# Patient Record
Sex: FEMALE | Race: BLACK OR AFRICAN AMERICAN | NOT HISPANIC OR LATINO | Employment: FULL TIME | URBAN - METROPOLITAN AREA
[De-identification: names, ages, dates, MRNs, and addresses within clinical notes are randomized per-mention and may not be internally consistent; named-entity substitution may affect disease eponyms.]

---

## 2021-11-23 ENCOUNTER — HOSPITAL ENCOUNTER (EMERGENCY)
Facility: CLINIC | Age: 46
Discharge: HOME | End: 2021-11-23
Attending: EMERGENCY MEDICINE | Admitting: EMERGENCY MEDICINE
Payer: OTHER GOVERNMENT

## 2021-11-23 ENCOUNTER — APPOINTMENT (OUTPATIENT)
Dept: GENERAL RADIOLOGY | Facility: CLINIC | Age: 46
End: 2021-11-23
Attending: EMERGENCY MEDICINE
Payer: OTHER GOVERNMENT

## 2021-11-23 VITALS
HEART RATE: 72 BPM | OXYGEN SATURATION: 100 % | WEIGHT: 250 LBS | SYSTOLIC BLOOD PRESSURE: 145 MMHG | RESPIRATION RATE: 18 BRPM | BODY MASS INDEX: 40.18 KG/M2 | DIASTOLIC BLOOD PRESSURE: 98 MMHG | HEIGHT: 66 IN | TEMPERATURE: 98.6 F

## 2021-11-23 DIAGNOSIS — I10 ESSENTIAL HYPERTENSION: ICD-10-CM

## 2021-11-23 DIAGNOSIS — R50.9 FEVER AND CHILLS: ICD-10-CM

## 2021-11-23 LAB
ALBUMIN SERPL-MCNC: 3.1 G/DL (ref 3.4–5)
ALBUMIN UR-MCNC: 50 MG/DL
ALP SERPL-CCNC: 56 U/L (ref 40–150)
ALT SERPL W P-5'-P-CCNC: 24 U/L (ref 0–50)
ANION GAP SERPL CALCULATED.3IONS-SCNC: 3 MMOL/L (ref 3–14)
APPEARANCE UR: CLEAR
AST SERPL W P-5'-P-CCNC: 19 U/L (ref 0–45)
BASOPHILS # BLD AUTO: 0 10E3/UL (ref 0–0.2)
BASOPHILS NFR BLD AUTO: 0 %
BILIRUB SERPL-MCNC: 0.2 MG/DL (ref 0.2–1.3)
BILIRUB UR QL STRIP: NEGATIVE
BUN SERPL-MCNC: 12 MG/DL (ref 7–30)
CALCIUM SERPL-MCNC: 8.4 MG/DL (ref 8.5–10.1)
CHLORIDE BLD-SCNC: 113 MMOL/L (ref 94–109)
CO2 SERPL-SCNC: 23 MMOL/L (ref 20–32)
COLOR UR AUTO: ABNORMAL
CREAT SERPL-MCNC: 1.08 MG/DL (ref 0.52–1.04)
EOSINOPHIL # BLD AUTO: 0.3 10E3/UL (ref 0–0.7)
EOSINOPHIL NFR BLD AUTO: 4 %
ERYTHROCYTE [DISTWIDTH] IN BLOOD BY AUTOMATED COUNT: 14.6 % (ref 10–15)
FLUAV RNA SPEC QL NAA+PROBE: NEGATIVE
FLUBV RNA RESP QL NAA+PROBE: NEGATIVE
GFR SERPL CREATININE-BSD FRML MDRD: 62 ML/MIN/1.73M2
GLUCOSE BLD-MCNC: 118 MG/DL (ref 70–99)
GLUCOSE UR STRIP-MCNC: NEGATIVE MG/DL
HCT VFR BLD AUTO: 39 % (ref 35–47)
HGB BLD-MCNC: 12.6 G/DL (ref 11.7–15.7)
HGB UR QL STRIP: ABNORMAL
IMM GRANULOCYTES # BLD: 0 10E3/UL
IMM GRANULOCYTES NFR BLD: 0 %
KETONES UR STRIP-MCNC: NEGATIVE MG/DL
LEUKOCYTE ESTERASE UR QL STRIP: ABNORMAL
LYMPHOCYTES # BLD AUTO: 1.9 10E3/UL (ref 0.8–5.3)
LYMPHOCYTES NFR BLD AUTO: 26 %
MCH RBC QN AUTO: 26.5 PG (ref 26.5–33)
MCHC RBC AUTO-ENTMCNC: 32.3 G/DL (ref 31.5–36.5)
MCV RBC AUTO: 82 FL (ref 78–100)
MONOCYTES # BLD AUTO: 0.4 10E3/UL (ref 0–1.3)
MONOCYTES NFR BLD AUTO: 6 %
MUCOUS THREADS #/AREA URNS LPF: PRESENT /LPF
NEUTROPHILS # BLD AUTO: 4.6 10E3/UL (ref 1.6–8.3)
NEUTROPHILS NFR BLD AUTO: 64 %
NITRATE UR QL: NEGATIVE
NRBC # BLD AUTO: 0 10E3/UL
NRBC BLD AUTO-RTO: 0 /100
PH UR STRIP: 5.5 [PH] (ref 5–7)
PLATELET # BLD AUTO: 302 10E3/UL (ref 150–450)
POTASSIUM BLD-SCNC: 4.3 MMOL/L (ref 3.4–5.3)
PROT SERPL-MCNC: 7.1 G/DL (ref 6.8–8.8)
RBC # BLD AUTO: 4.75 10E6/UL (ref 3.8–5.2)
RBC URINE: 1 /HPF
SARS-COV-2 RNA RESP QL NAA+PROBE: NEGATIVE
SODIUM SERPL-SCNC: 139 MMOL/L (ref 133–144)
SP GR UR STRIP: 1.02 (ref 1–1.03)
SQUAMOUS EPITHELIAL: 6 /HPF
UROBILINOGEN UR STRIP-MCNC: NORMAL MG/DL
WBC # BLD AUTO: 7.2 10E3/UL (ref 4–11)
WBC URINE: 6 /HPF

## 2021-11-23 PROCEDURE — 80053 COMPREHEN METABOLIC PANEL: CPT | Performed by: EMERGENCY MEDICINE

## 2021-11-23 PROCEDURE — 87086 URINE CULTURE/COLONY COUNT: CPT | Performed by: EMERGENCY MEDICINE

## 2021-11-23 PROCEDURE — 85004 AUTOMATED DIFF WBC COUNT: CPT | Performed by: EMERGENCY MEDICINE

## 2021-11-23 PROCEDURE — 81001 URINALYSIS AUTO W/SCOPE: CPT | Performed by: EMERGENCY MEDICINE

## 2021-11-23 PROCEDURE — C9803 HOPD COVID-19 SPEC COLLECT: HCPCS

## 2021-11-23 PROCEDURE — 36415 COLL VENOUS BLD VENIPUNCTURE: CPT | Performed by: EMERGENCY MEDICINE

## 2021-11-23 PROCEDURE — 99284 EMERGENCY DEPT VISIT MOD MDM: CPT | Mod: 25

## 2021-11-23 PROCEDURE — 87636 SARSCOV2 & INF A&B AMP PRB: CPT | Performed by: EMERGENCY MEDICINE

## 2021-11-23 PROCEDURE — 71046 X-RAY EXAM CHEST 2 VIEWS: CPT

## 2021-11-23 RX ORDER — HYDROCHLOROTHIAZIDE 25 MG/1
25 TABLET ORAL DAILY
Qty: 30 TABLET | Refills: 0 | Status: SHIPPED | OUTPATIENT
Start: 2021-11-23

## 2021-11-23 ASSESSMENT — ENCOUNTER SYMPTOMS
COUGH: 0
BACK PAIN: 1
VOMITING: 0
MYALGIAS: 1
FEVER: 1
DYSURIA: 0
ABDOMINAL PAIN: 0
NAUSEA: 0
SORE THROAT: 0
FREQUENCY: 1

## 2021-11-23 ASSESSMENT — MIFFLIN-ST. JEOR: SCORE: 1795.74

## 2021-11-23 NOTE — ED PROVIDER NOTES
"  History   Chief Complaint:  Fever    The history is provided by the patient.      Cristy Sandoval is a 46 year old female who presents to the ER with a fever. Her fever started this morning with a measured temp of 102. She reports associated myalgias and right-sided low back pain. She is using the bathroom frequently and denies pain when urinating. She denies any nausea, vomiting or abdominal pain. No cough or sore throat. She states not being in contact with anyone sick but works at the [employer] as a . No history of abdominal surgery other than a tubal ligation. She smokes tobacco and denies drinking alcohol.     Review of Systems   Constitutional: Positive for fever.   HENT: Negative for sore throat.    Respiratory: Negative for cough.    Gastrointestinal: Negative for abdominal pain, nausea and vomiting.   Genitourinary: Positive for frequency. Negative for dysuria.   Musculoskeletal: Positive for back pain and myalgias.   All other systems reviewed and are negative.        Allergies:  No Known Allergies    Medications:  The patient is currently on no regular medications.    Past Medical History:     Hypertension    Past Surgical History:  Tubal ligation     Social History:  Presents unaccompanied  Works at the [employer]  Endorses tobacco use  Denies alcohol use    Physical Exam     Patient Vitals for the past 24 hrs:   BP Temp Temp src Pulse Resp SpO2 Height Weight   11/23/21 1140 (!) 145/98 -- -- 72 -- 100 % -- --   11/23/21 1020 (!) 171/126 -- -- 95 -- 100 % -- --   11/23/21 0933 (!) 180/103 98.6  F (37  C) Temporal 91 18 99 % 1.676 m (5' 6\") 113.4 kg (250 lb)       Physical Exam  General: Patient is alert and normal appearing.  HEENT: Head atraumatic    Eyes: pupils equal and reactive. Conjunctiva clear   Nares: patent   Oropharynx: no lesions, uvula midline, no palatal draping, normal voice, no trismus  Neck: Supple without lymphadenopathy, no meningismus  Chest: Heart regular rate and rhythm. "   Lungs: Equal clear to auscultation with no wheeze or rales  Abdomen: Soft, non tender, nondistended, normal bowel sounds  Back: No costovertebral angle tenderness, no midline C, T or L spine tenderness  Neuro: Grossly nonfocal, normal speech, strength equal bilaterally, CN 2-12 intact  Extremities: No deformities, equal radial and DP pulses. No clubbing, cyanosis.  No edema  Skin: Warm and dry with no rash.       Emergency Department Course     Imaging:  XR Chest 2 Views   Final Result   IMPRESSION: PA and lateral views of the chest were obtained.   Cardiomediastinal silhouette is within normal limits. No suspicious   focal pulmonary opacities. No significant pleural effusion or   pneumothorax.      ALEX HOWARD MD            SYSTEM ID:  [id number]        Report per radiology    Laboratory:  Labs Ordered and Resulted from Time of ED Arrival to Time of ED Departure   COMPREHENSIVE METABOLIC PANEL - Abnormal       Result Value    Sodium 139      Potassium 4.3      Chloride 113 (*)     Carbon Dioxide (CO2) 23      Anion Gap 3      Urea Nitrogen 12      Creatinine 1.08 (*)     Calcium 8.4 (*)     Glucose 118 (*)     Alkaline Phosphatase 56      AST 19      ALT 24      Protein Total 7.1      Albumin 3.1 (*)     Bilirubin Total 0.2      GFR Estimate 62     ROUTINE UA WITH MICROSCOPIC REFLEX TO CULTURE - Abnormal    Color Urine Light Yellow      Appearance Urine Clear      Glucose Urine Negative      Bilirubin Urine Negative      Ketones Urine Negative      Specific Gravity Urine 1.017      Blood Urine Small (*)     pH Urine 5.5      Protein Albumin Urine 50  (*)     Urobilinogen Urine Normal      Nitrite Urine Negative      Leukocyte Esterase Urine Small (*)     Mucus Urine Present (*)     RBC Urine 1      WBC Urine 6 (*)     Squamous Epithelials Urine 6 (*)    INFLUENZA A/B & SARS-COV2 PCR MULTIPLEX - Normal    Influenza A PCR Negative      Influenza B PCR Negative      SARS CoV2 PCR Negative     CBC WITH PLATELETS  AND DIFFERENTIAL    WBC Count 7.2      RBC Count 4.75      Hemoglobin 12.6      Hematocrit 39.0      MCV 82      MCH 26.5      MCHC 32.3      RDW 14.6      Platelet Count 302      % Neutrophils 64      % Lymphocytes 26      % Monocytes 6      % Eosinophils 4      % Basophils 0      % Immature Granulocytes 0      NRBCs per 100 WBC 0      Absolute Neutrophils 4.6      Absolute Lymphocytes 1.9      Absolute Monocytes 0.4      Absolute Eosinophils 0.3      Absolute Basophils 0.0      Absolute Immature Granulocytes 0.0      Absolute NRBCs 0.0     URINE CULTURE        Emergency Department Course:  Reviewed:  I reviewed nursing notes, vitals, past medical history, Care Everywhere    Assessments:  1017 I obtained history and examined the patient as noted above.   1123 Patient rechecked and updated.  She was notified of her high blood pressure while incarcerated but has not been started on medications for this and does not have a primary care provider.     Disposition:  The patient was discharged to home.     Impression & Plan     Medical Decision Making:  Cristy Sandoval is a 46 year old female who presents for evaluation of fever, myalgia, right flank pain that began this morning. There is no signs at this point of serious bacterial infection such as OM, RPA, epiglottitis, PTA, strep pharyngitis, pneumonia, sinusitis, meningitis, bacteremia, serious bacterial infection.  Chest x-ray is negative for infiltrate.  Urinalysis is negative for obvious UTI and urine culture was sent.  Do not suspect kidney stone.    There are no gastrointestinal symptoms at this point and no signs of dehydration.     Patient is afebrile and hemodynamically stable here in the emergency department.  No localizing source of infection is identified.  Covid test is negative as well as influenza screen.  She is recommended to have a retest in 72 hours of her times continue.  If her symptoms further declare themselves including fevers over 102, cough,  shortness of breath, abdominal pain, nausea, vomiting, diarrhea she is to return for further work-up and evaluation.  She has a benign abdominal examination and would not pursue CT scan imaging at this time for intra-abdominal surgical emergency.    She was noted to be quite hypertensive.  She states she has been told in the past while incarcerated that she has hypertension but was never prescribed medication for this.  She does have a very mild increase of her creatinine as well and I am concerned for her longstanding hypertension.  Small dose of hydrochlorothiazide was provided and a referral to primary care was made and patient is recommended to check her blood pressure as an outpatient and follow-up with primary care for this.  She denies chest pain.  No signs or symptoms to suggest heart failure at this time.  Do not suspect intracranial hemorrhage.  Feel the patient is appropriate for outpatient continued work-up and follow-up of her blood pressure.     Close followup with primary care physician is indicated.  Return to ED for fever > 103, protracted vomiting, confusion, shortness of breath or vomiting.        Covid-19  Cristy Sandoval was evaluated during a global COVID-19 pandemic, which necessitated consideration that the patient might be at risk for infection with the SARS-CoV-2 virus that causes COVID-19.   Applicable protocols for evaluation were followed during the patient's care.   COVID-19 was considered as part of the patient's evaluation. The plan for testing is:  a test was obtained during this visit.    Diagnosis:    ICD-10-CM    1. Fever and chills  R50.9    2. Essential hypertension  I10 Primary Care Referral       Discharge Medications:  Discharge Medication List as of 11/23/2021 11:29 AM      START taking these medications    Details   hydrochlorothiazide (HYDRODIURIL) 25 MG tablet Take 1 tablet (25 mg) by mouth daily, Disp-30 tablet, R-0, Local Print             Scribe Disclosure:  Claire ROBERT  Laurent (trainee) and Andie Jett (), am serving as a scribe at 11:33 AM on 11/23/2021 to document services personally performed by Erica Vázquez MD based on my observations and the provider's statements to me.              Erica Vázquez MD  11/23/21 [id number]

## 2021-11-23 NOTE — Clinical Note
Cristy Sandoval was seen and treated in our emergency department on 11/23/2021.  She may return to work on 11/24/2021.       If you have any questions or concerns, please don't hesitate to call.      Erica Vázquez MD

## 2021-11-24 LAB — BACTERIA UR CULT: NO GROWTH

## 2021-11-24 NOTE — RESULT ENCOUNTER NOTE
"Final urine culture report shows \"NO GROWTH\" and is NEGATIVE.  Recommendations in treatment per [facility] ED Lab result Urine culture protocol.  "

## 2023-12-13 ENCOUNTER — TELEPHONE (OUTPATIENT)
Dept: FAMILY MEDICINE | Facility: CLINIC | Age: 48
End: 2023-12-13

## 2023-12-13 NOTE — TELEPHONE ENCOUNTER
Spoke with pt, pt stated that she had an appointment at her clinic today. Pt stated that she did not want to reschedule. Task completed [facility] @4:32pm